# Patient Record
Sex: FEMALE | URBAN - METROPOLITAN AREA
[De-identification: names, ages, dates, MRNs, and addresses within clinical notes are randomized per-mention and may not be internally consistent; named-entity substitution may affect disease eponyms.]

---

## 2020-12-08 ENCOUNTER — WEB ENCOUNTER (OUTPATIENT)
Dept: URBAN - METROPOLITAN AREA CLINIC 2 | Facility: CLINIC | Age: 40
End: 2020-12-08

## 2020-12-08 ENCOUNTER — OFFICE VISIT (OUTPATIENT)
Dept: URBAN - METROPOLITAN AREA CLINIC 2 | Facility: CLINIC | Age: 40
End: 2020-12-08
Payer: COMMERCIAL

## 2020-12-08 DIAGNOSIS — K57.92 DIVERTICULITIS: ICD-10-CM

## 2020-12-08 PROCEDURE — 99203 OFFICE O/P NEW LOW 30 MIN: CPT | Performed by: INTERNAL MEDICINE

## 2020-12-08 PROCEDURE — G8420 CALC BMI NORM PARAMETERS: HCPCS | Performed by: INTERNAL MEDICINE

## 2020-12-08 PROCEDURE — G8427 DOCREV CUR MEDS BY ELIG CLIN: HCPCS | Performed by: INTERNAL MEDICINE

## 2020-12-08 PROCEDURE — G8482 FLU IMMUNIZE ORDER/ADMIN: HCPCS | Performed by: INTERNAL MEDICINE

## 2020-12-08 PROCEDURE — 1036F TOBACCO NON-USER: CPT | Performed by: INTERNAL MEDICINE

## 2020-12-08 NOTE — HPI-OTHER HISTORIES
She comes in today to discuss diverticulitis.  She has had two episodes this year both confirmed with a CT scan.  She is very healthy and active.  She denies significant constipation.

## 2020-12-11 ENCOUNTER — OFFICE VISIT (OUTPATIENT)
Dept: URBAN - METROPOLITAN AREA SURGERY CENTER 19 | Facility: SURGERY CENTER | Age: 40
End: 2020-12-11
Payer: COMMERCIAL

## 2020-12-11 DIAGNOSIS — R93.3 ABN FINDINGS-GI TRACT: ICD-10-CM

## 2020-12-11 PROCEDURE — G8907 PT DOC NO EVENTS ON DISCHARG: HCPCS | Performed by: INTERNAL MEDICINE

## 2020-12-11 PROCEDURE — G9937 DIG OR SURV COLSCO: HCPCS | Performed by: INTERNAL MEDICINE

## 2020-12-11 PROCEDURE — 45380 COLONOSCOPY AND BIOPSY: CPT | Performed by: INTERNAL MEDICINE

## 2020-12-28 ENCOUNTER — WEB ENCOUNTER (OUTPATIENT)
Dept: URBAN - METROPOLITAN AREA CLINIC 2 | Facility: CLINIC | Age: 40
End: 2020-12-28

## 2021-01-04 ENCOUNTER — WEB ENCOUNTER (OUTPATIENT)
Dept: URBAN - METROPOLITAN AREA CLINIC 2 | Facility: CLINIC | Age: 41
End: 2021-01-04

## 2021-01-07 ENCOUNTER — TELEPHONE ENCOUNTER (OUTPATIENT)
Dept: URBAN - METROPOLITAN AREA CLINIC 23 | Facility: CLINIC | Age: 41
End: 2021-01-07

## 2021-01-07 RX ORDER — DICYCLOMINE HYDROCHLORIDE 10 MG/1
1 TABLET CAPSULE ORAL
Qty: 20 | Refills: 1 | OUTPATIENT
Start: 2021-01-08 | End: 2021-02-05

## 2021-03-09 ENCOUNTER — OFFICE VISIT (OUTPATIENT)
Dept: URBAN - METROPOLITAN AREA CLINIC 2 | Facility: CLINIC | Age: 41
End: 2021-03-09
Payer: COMMERCIAL

## 2021-03-09 DIAGNOSIS — K57.92 DIVERTICULITIS: ICD-10-CM

## 2021-03-09 DIAGNOSIS — R10.84 GENERALIZED ABDOMINAL PAIN: ICD-10-CM

## 2021-03-09 PROBLEM — 307496006: Status: ACTIVE | Noted: 2020-12-08

## 2021-03-09 PROCEDURE — 99213 OFFICE O/P EST LOW 20 MIN: CPT | Performed by: INTERNAL MEDICINE

## 2021-03-09 NOTE — HPI-OTHER HISTORIES
She comes in today for interval follow up.  Her colonoscopy was notable for left sided diverticulosis and normal biopsies of the colon and terminal ileum.  In general she has done well.  She is considering having another baby.  She does note occasional abdominal pain that occurs with walking.  She denies constipation.

## 2021-03-13 LAB
ENDOMYSIAL ANTIBODY IGA: NEGATIVE
IMMUNOGLOBULIN A, QN, SERUM: 206
T-TRANSGLUTAMINASE (TTG) IGA: <2

## 2022-04-22 ENCOUNTER — TELEPHONE ENCOUNTER (OUTPATIENT)
Dept: URBAN - METROPOLITAN AREA CLINIC 80 | Facility: CLINIC | Age: 42
End: 2022-04-22

## 2022-04-22 RX ORDER — METRONIDAZOLE 500 MG/1
1 TABLET TABLET ORAL THREE TIMES A DAY
Qty: 30 | OUTPATIENT
Start: 2022-04-22 | End: 2022-05-02

## 2022-04-22 RX ORDER — CIPROFLOXACIN 500 MG/1
1 TABLET TABLET, FILM COATED ORAL
Qty: 20 | OUTPATIENT
Start: 2022-04-22 | End: 2022-05-02

## 2023-03-09 ENCOUNTER — OFFICE VISIT (OUTPATIENT)
Dept: URBAN - METROPOLITAN AREA CLINIC 2 | Facility: CLINIC | Age: 43
End: 2023-03-09
Payer: COMMERCIAL

## 2023-03-09 ENCOUNTER — DASHBOARD ENCOUNTERS (OUTPATIENT)
Age: 43
End: 2023-03-09

## 2023-03-09 ENCOUNTER — LAB OUTSIDE AN ENCOUNTER (OUTPATIENT)
Dept: URBAN - METROPOLITAN AREA CLINIC 2 | Facility: CLINIC | Age: 43
End: 2023-03-09

## 2023-03-09 VITALS
DIASTOLIC BLOOD PRESSURE: 80 MMHG | WEIGHT: 131.8 LBS | SYSTOLIC BLOOD PRESSURE: 121 MMHG | BODY MASS INDEX: 20.69 KG/M2 | TEMPERATURE: 96.7 F | HEIGHT: 67 IN

## 2023-03-09 DIAGNOSIS — Z3A.16 16 WEEKS GESTATION OF PREGNANCY: ICD-10-CM

## 2023-03-09 DIAGNOSIS — R10.32 LLQ ABDOMINAL PAIN: ICD-10-CM

## 2023-03-09 DIAGNOSIS — K59.09 OTHER CONSTIPATION: ICD-10-CM

## 2023-03-09 PROCEDURE — 99204 OFFICE O/P NEW MOD 45 MIN: CPT | Performed by: NURSE PRACTITIONER

## 2023-03-09 NOTE — PHYSICAL EXAM GASTROINTESTINAL
Abdomen , soft, mild TTP LLQ, nondistended , no guarding or rigidity , no masses palpable , normal bowel sounds , Liver and Spleen  liver nontender , spleen not palpable , gravid uterus

## 2023-03-09 NOTE — HPI-TODAY'S VISIT:
Very pleasant 42-year-old female seen today for complaints of abdominal pain.  Her symptoms began February 16.  At first she thought it may have been gas pains.  Pain is left lower quadrant.  She does have a history of diverticulitis in 2020x2.  The symptoms did not feel similar.  She had no difficulty tolerating food.  Patient is currently 16 weeks pregnant.  She did see primary care who did an ultrasound and was told she might have a twisted bowel loop.  Her symptoms resolved for several weeks but then's have slowly recurred.  She does have some mild constipation.  She takes MiraLAX about 1 time a week.  She reports having daily bowel movements.  She denies fever or chills.

## 2025-02-28 ENCOUNTER — LAB OUTSIDE AN ENCOUNTER (OUTPATIENT)
Dept: URBAN - METROPOLITAN AREA CLINIC 2 | Facility: CLINIC | Age: 45
End: 2025-02-28

## 2025-02-28 ENCOUNTER — OFFICE VISIT (OUTPATIENT)
Dept: URBAN - METROPOLITAN AREA CLINIC 2 | Facility: CLINIC | Age: 45
End: 2025-02-28
Payer: COMMERCIAL

## 2025-02-28 VITALS
BODY MASS INDEX: 19.87 KG/M2 | DIASTOLIC BLOOD PRESSURE: 78 MMHG | WEIGHT: 126.6 LBS | HEIGHT: 67 IN | HEART RATE: 90 BPM | TEMPERATURE: 98.1 F | SYSTOLIC BLOOD PRESSURE: 119 MMHG

## 2025-02-28 DIAGNOSIS — R11.0 NAUSEA: ICD-10-CM

## 2025-02-28 PROCEDURE — 99213 OFFICE O/P EST LOW 20 MIN: CPT | Performed by: NURSE PRACTITIONER

## 2025-02-28 RX ORDER — POLYETHYLENE GLYCOL 3350 17 G/DOSE
1 SCOOP MIXED WITH 8 OUNCES OF FLUID POWDER (GRAM) ORAL ONCE A DAY
Status: ACTIVE | COMMUNITY

## 2025-02-28 RX ORDER — PANTOPRAZOLE SODIUM 40 MG/1
1 TABLET TABLET, DELAYED RELEASE ORAL ONCE A DAY
Qty: 90 TABLET | Refills: 3 | OUTPATIENT
Start: 2025-02-28

## 2025-02-28 NOTE — HPI-TODAY'S VISIT:
Very pleasant 44-year-old female seen today for complaints of severe nausea.  Her symptoms began Tuesday while she was exercising.  Since then she has not been able to eat.  Last night she did feel like she was having reflux.  She went to urgent care.  Labs were normal.  She did have a mildly abnormal EKG and has an appointment with cardiology this afternoon.  She denies any chest pain.  Denies constipation or overt GI bleeding.  No new medications except women's multivitamin.  No sick contacts. no regular nsaid use.